# Patient Record
Sex: FEMALE | URBAN - METROPOLITAN AREA
[De-identification: names, ages, dates, MRNs, and addresses within clinical notes are randomized per-mention and may not be internally consistent; named-entity substitution may affect disease eponyms.]

---

## 2021-07-27 ENCOUNTER — OFFICE VISIT (OUTPATIENT)
Dept: URBAN - METROPOLITAN AREA CLINIC 71 | Facility: CLINIC | Age: 66
End: 2021-07-27
Payer: MEDICARE

## 2021-07-27 DIAGNOSIS — H43.813 VITREOUS DEGENERATION, BILATERAL: Primary | ICD-10-CM

## 2021-07-27 DIAGNOSIS — H52.4 PRESBYOPIA: ICD-10-CM

## 2021-07-27 DIAGNOSIS — H25.13 AGE-RELATED NUCLEAR CATARACT, BILATERAL: ICD-10-CM

## 2021-07-27 PROCEDURE — 99204 OFFICE O/P NEW MOD 45 MIN: CPT | Performed by: OPTOMETRIST

## 2021-07-27 ASSESSMENT — VISUAL ACUITY
OD: 20/20
OS: 20/20

## 2021-07-27 ASSESSMENT — INTRAOCULAR PRESSURE
OS: 10
OD: 12

## 2021-07-27 NOTE — IMPRESSION/PLAN
Impression: Presbyopia: H52.4. Plan: Presbyopia is the inability to focus on objects (ie: accommodate) due to the loss of flexibility of your natural lens. Presbyopia occurs with age. Reading glasses, bifocals, trifocals or contacts can be helpful. Contact the office if difficulty focusing persists despite corrective eye wear. New glasses RX given today for DVO. PT wears OTC readers.

## 2021-07-27 NOTE — IMPRESSION/PLAN
Impression: Vitreous degeneration, bilateral: H43.813. Plan: The PVD is stable, and there is no evidence of a retinal tear or detachment on dilated exam.  I again reviewed the signs and symptoms of a retinal tear and detachment in detail with the patient, including worsening flashes, new floaters, and development of a shadow/curtain in the peripheral visual field. The patient was advised to call immediately with any changes to [address] or increase in symptoms.

## 2021-07-27 NOTE — IMPRESSION/PLAN
Impression: Age-related nuclear cataract, bilateral: H25.13. Plan: Cataracts are stable OU. No treatment currently recommended. The patient will monitor vision changes and contact us with any decrease in vision.

## 2023-02-16 ENCOUNTER — OFFICE VISIT (OUTPATIENT)
Dept: URBAN - METROPOLITAN AREA CLINIC 71 | Facility: CLINIC | Age: 68
End: 2023-02-16
Payer: COMMERCIAL

## 2023-02-16 DIAGNOSIS — H52.4 PRESBYOPIA: Primary | ICD-10-CM

## 2023-02-16 DIAGNOSIS — H25.13 AGE-RELATED NUCLEAR CATARACT, BILATERAL: ICD-10-CM

## 2023-02-16 PROCEDURE — 92014 COMPRE OPH EXAM EST PT 1/>: CPT | Performed by: OPTOMETRIST

## 2023-02-16 ASSESSMENT — VISUAL ACUITY
OD: 20/20
OS: 20/20

## 2023-02-16 ASSESSMENT — INTRAOCULAR PRESSURE
OD: 10
OS: 9

## 2023-02-16 NOTE — IMPRESSION/PLAN
Impression: Presbyopia: H52.4. Plan: Presbyopia is the inability to focus on objects (ie: accommodate) due to the loss of flexibility of your natural lens. Presbyopia occurs with age. Reading glasses, bifocals, trifocals or contacts can be helpful. Contact the office if difficulty focusing persists despite corrective eye wear. New glasses RX given today for DVO. Changes in the prescription discussed.  
Continue to follow annually for vision exams

## 2024-06-26 ENCOUNTER — OFFICE VISIT (OUTPATIENT)
Dept: URBAN - METROPOLITAN AREA CLINIC 71 | Facility: CLINIC | Age: 69
End: 2024-06-26
Payer: MEDICARE

## 2024-06-26 DIAGNOSIS — H25.13 AGE-RELATED NUCLEAR CATARACT, BILATERAL: ICD-10-CM

## 2024-06-26 DIAGNOSIS — H43.813 VITREOUS DEGENERATION, BILATERAL: Primary | ICD-10-CM

## 2024-06-26 PROCEDURE — 99213 OFFICE O/P EST LOW 20 MIN: CPT | Performed by: OPTOMETRIST

## 2024-06-26 ASSESSMENT — INTRAOCULAR PRESSURE
OS: 11
OD: 12

## 2025-05-12 ENCOUNTER — OFFICE VISIT (OUTPATIENT)
Dept: URBAN - METROPOLITAN AREA CLINIC 71 | Facility: CLINIC | Age: 70
End: 2025-05-12
Payer: MEDICARE

## 2025-05-12 DIAGNOSIS — H04.123 DRY EYE SYNDROME OF BILATERAL LACRIMAL GLANDS: ICD-10-CM

## 2025-05-12 DIAGNOSIS — H43.813 VITREOUS DEGENERATION, BILATERAL: Primary | ICD-10-CM

## 2025-05-12 DIAGNOSIS — H25.13 AGE-RELATED NUCLEAR CATARACT, BILATERAL: ICD-10-CM

## 2025-05-12 PROCEDURE — 99213 OFFICE O/P EST LOW 20 MIN: CPT

## 2025-05-12 ASSESSMENT — INTRAOCULAR PRESSURE
OS: 10
OD: 8